# Patient Record
Sex: MALE | Employment: FULL TIME | URBAN - METROPOLITAN AREA
[De-identification: names, ages, dates, MRNs, and addresses within clinical notes are randomized per-mention and may not be internally consistent; named-entity substitution may affect disease eponyms.]

---

## 2023-05-08 ENCOUNTER — TELEPHONE (OUTPATIENT)
Dept: GASTROENTEROLOGY | Age: 58
End: 2023-05-08

## 2023-05-11 ENCOUNTER — OFFICE VISIT (OUTPATIENT)
Dept: GASTROENTEROLOGY | Age: 58
End: 2023-05-11
Payer: COMMERCIAL

## 2023-05-11 VITALS
TEMPERATURE: 97.8 F | HEART RATE: 74 BPM | HEIGHT: 73 IN | OXYGEN SATURATION: 94 % | SYSTOLIC BLOOD PRESSURE: 128 MMHG | BODY MASS INDEX: 25.58 KG/M2 | DIASTOLIC BLOOD PRESSURE: 82 MMHG | WEIGHT: 193 LBS

## 2023-05-11 DIAGNOSIS — R10.13 DYSPEPSIA: Primary | ICD-10-CM

## 2023-05-11 PROCEDURE — 99204 OFFICE O/P NEW MOD 45 MIN: CPT | Performed by: SPECIALIST

## 2023-05-11 RX ORDER — SUCRALFATE 1 G/1
TABLET ORAL
COMMUNITY
Start: 2023-05-01

## 2023-05-11 RX ORDER — TIZANIDINE 2 MG/1
1 TABLET ORAL NIGHTLY PRN
COMMUNITY
Start: 2022-12-12

## 2023-05-11 RX ORDER — PANTOPRAZOLE SODIUM 40 MG/1
TABLET, DELAYED RELEASE ORAL
COMMUNITY
Start: 2023-03-12

## 2023-05-11 RX ORDER — AMLODIPINE BESYLATE 5 MG/1
TABLET ORAL
COMMUNITY
Start: 2023-04-28

## 2023-05-11 RX ORDER — ONDANSETRON 4 MG/1
TABLET, ORALLY DISINTEGRATING ORAL
COMMUNITY
Start: 2023-04-17

## 2023-05-11 RX ORDER — DICYCLOMINE HCL 20 MG
TABLET ORAL
COMMUNITY
Start: 2023-03-04

## 2023-05-11 RX ORDER — MIRTAZAPINE 7.5 MG/1
TABLET, FILM COATED ORAL
COMMUNITY
Start: 2023-03-12

## 2023-05-11 RX ORDER — CITALOPRAM 40 MG/1
TABLET ORAL
COMMUNITY
Start: 2023-04-28

## 2023-05-11 RX ORDER — GABAPENTIN 300 MG/1
300 CAPSULE ORAL 3 TIMES DAILY
Qty: 90 CAPSULE | Refills: 5 | COMMUNITY
Start: 2023-01-26 | End: 2023-07-25

## 2023-05-11 RX ORDER — PRAVASTATIN SODIUM 80 MG/1
TABLET ORAL
COMMUNITY
Start: 2023-04-28

## 2023-05-11 NOTE — PROGRESS NOTES
Gastroenterology Consult Note  Duncan Coy. Antonino DAVID      Reason for Consult:  abd pain  Primary Care / referring Physician:  Kaitlyn Fortune      History Obtained From:  patient    CC: abd pain    HISTORY OF PRESENT ILLNESS:          Has had upper GI symptoms for 6 months or so-- had lap nelson (he says + stones) 2023-- symptoms different  now-- upper abd burning and tightness-- \"radiates up esophagus into mouth\" . Has noted blood in his saliva. No dysphagia or vomiting . Stool recently dark but took [address]. Has occasional diarrhea. Was taking 800 mg ibuprofen up until 3 weeks ago. has been on Protonix x 8 months-- went to [facility] [place] [facility]-- increased Protonix to 40 mg BID and added  Carafate. Labs were normal then. CT was negative 3/2023  Had colonoscopy and EGD 2023-    Reviewed from [facility] [place]: CBC, CMP, EGD, colonoscopy, path reports of GB and endoscopic biopsies    Takes Protonix, Carafate, Remeron 7.5 mg qhs, Celexa 40 mg/d- not taking Bentyl    Past Medical History:    No past medical history on file. Past Surgical History:    No past surgical history on file. Social History:   Social History     Tobacco Use    Smoking status: Former     Packs/day: 1.00     Years: 7.00     Pack years: 7.00     Types: Cigarettes     Start date:      Quit date: 1993     Years since quittin.2    Smokeless tobacco: Never   Substance Use Topics    Alcohol use: Not Currently       Medications:   Prior to Admission medications    Medication Sig Start Date End Date Taking? Authorizing Provider   amLODIPine (NORVASC) 5 MG tablet  23  Yes Historical Provider, MD   citalopram (CELEXA) 40 MG tablet  23  Yes Historical Provider, MD   dicyclomine (BENTYL) 20 MG tablet  3/4/23  Yes Historical Provider, MD   gabapentin (NEURONTIN) 300 MG capsule Take 1 capsule by mouth 3 times daily.  23 Yes Historical Provider, MD   mirtazapine (REMERON) 7.5 MG tablet  3/12/23  Yes Historical Provider, MD

## 2023-05-22 ENCOUNTER — TELEPHONE (OUTPATIENT)
Dept: GASTROENTEROLOGY | Age: 58
End: 2023-05-22

## 2023-05-22 NOTE — TELEPHONE ENCOUNTER
Left another message for patient-must have insurance info by Weds. 5/24/23 or his procedure will be cancelled. It must be prior authorized before the procedure! Initial message was left on 5/15/23 asking for insurance info at that time also.

## 2023-05-30 ENCOUNTER — ANESTHESIA EVENT (OUTPATIENT)
Dept: ENDOSCOPY | Age: 58
End: 2023-05-30
Payer: COMMERCIAL

## 2023-05-30 NOTE — H&P
Original H &P in soft chart. I have examined the patient immediately before the procedure and there is no change in the previous history and physical exam, which has been reviewed. There is no history of sleep apnea, snoring, or stridor. There has been no  previous adverse experience with sedation/anesthesia. There is no increased risk for aspiration of gastric contents. The patient has been instructed that all resuscitative measures (during the operative and immediate perioperative period) will be instituted in the unlikely event that they will be needed. The patient has no pertinent past surgical or family history other than listed in the original H&P. The patient was counseled about the risks of erika Covid-19 during their perioperative period and any recovery window from their procedure. The patient was made aware that erika Covid-19  may worsen their prognosis for recovering from their procedure  and lend to a higher morbidity and/or mortality risk. All material risks, benefits, and reasonable alternatives including postponing the procedure were discussed. The patient does wish to proceed with the procedure at this time.     ASA Class: 2  AIRWAY Class: 1

## 2023-05-30 NOTE — PROGRESS NOTES
Spoke with patient and he will arrive at 1315 at [facility] on 5/31/2023 for his procedure at 1445. IV order in epic, placed by Tosha KAPLAN.

## 2023-05-31 ENCOUNTER — HOSPITAL ENCOUNTER (OUTPATIENT)
Age: 58
Setting detail: OUTPATIENT SURGERY
Discharge: HOME | End: 2023-05-31
Attending: SPECIALIST | Admitting: SPECIALIST
Payer: COMMERCIAL

## 2023-05-31 ENCOUNTER — ANESTHESIA (OUTPATIENT)
Dept: ENDOSCOPY | Age: 58
End: 2023-05-31
Payer: COMMERCIAL

## 2023-05-31 VITALS
SYSTOLIC BLOOD PRESSURE: 148 MMHG | DIASTOLIC BLOOD PRESSURE: 86 MMHG | RESPIRATION RATE: 18 BRPM | OXYGEN SATURATION: 96 % | BODY MASS INDEX: 25.58 KG/M2 | WEIGHT: 193 LBS | HEIGHT: 73 IN | TEMPERATURE: 97.6 F | HEART RATE: 59 BPM

## 2023-05-31 DIAGNOSIS — R10.13 DYSPEPSIA: ICD-10-CM

## 2023-05-31 PROBLEM — K29.00 ACUTE GASTRITIS WITHOUT HEMORRHAGE: Status: ACTIVE | Noted: 2023-05-31

## 2023-05-31 PROCEDURE — 87077 CULTURE AEROBIC IDENTIFY: CPT

## 2023-05-31 PROCEDURE — 3700000001 HC ADD 15 MINUTES (ANESTHESIA): Performed by: SPECIALIST

## 2023-05-31 PROCEDURE — 2709999900 HC NON-CHARGEABLE SUPPLY: Performed by: SPECIALIST

## 2023-05-31 PROCEDURE — 7100000010 HC PHASE II RECOVERY - FIRST 15 MIN: Performed by: SPECIALIST

## 2023-05-31 PROCEDURE — 3609012400 HC EGD TRANSORAL BIOPSY SINGLE/MULTIPLE: Performed by: SPECIALIST

## 2023-05-31 PROCEDURE — 3700000000 HC ANESTHESIA ATTENDED CARE: Performed by: SPECIALIST

## 2023-05-31 PROCEDURE — 2580000003 HC RX 258

## 2023-05-31 PROCEDURE — 2500000003 HC RX 250 WO HCPCS

## 2023-05-31 PROCEDURE — 6360000002 HC RX W HCPCS

## 2023-05-31 RX ORDER — SODIUM CHLORIDE, SODIUM LACTATE, POTASSIUM CHLORIDE, CALCIUM CHLORIDE 600; 310; 30; 20 MG/100ML; MG/100ML; MG/100ML; MG/100ML
INJECTION, SOLUTION INTRAVENOUS CONTINUOUS
Status: DISCONTINUED | OUTPATIENT
Start: 2023-05-31 | End: 2023-05-31 | Stop reason: HOSPADM

## 2023-05-31 RX ORDER — PROPOFOL 10 MG/ML
INJECTION, EMULSION INTRAVENOUS PRN
Status: DISCONTINUED | OUTPATIENT
Start: 2023-05-31 | End: 2023-05-31 | Stop reason: SDUPTHER

## 2023-05-31 RX ORDER — SODIUM CHLORIDE 9 MG/ML
INJECTION, SOLUTION INTRAVENOUS CONTINUOUS PRN
Status: DISCONTINUED | OUTPATIENT
Start: 2023-05-31 | End: 2023-05-31 | Stop reason: SDUPTHER

## 2023-05-31 RX ORDER — LIDOCAINE HYDROCHLORIDE 20 MG/ML
INJECTION, SOLUTION INFILTRATION; PERINEURAL PRN
Status: DISCONTINUED | OUTPATIENT
Start: 2023-05-31 | End: 2023-05-31 | Stop reason: SDUPTHER

## 2023-05-31 RX ADMIN — PROPOFOL 150 MG: 10 INJECTION, EMULSION INTRAVENOUS at 14:35

## 2023-05-31 RX ADMIN — SODIUM CHLORIDE: 9 INJECTION, SOLUTION INTRAVENOUS at 14:31

## 2023-05-31 RX ADMIN — LIDOCAINE HYDROCHLORIDE 100 MG: 20 INJECTION, SOLUTION INFILTRATION; PERINEURAL at 14:35

## 2023-05-31 ASSESSMENT — PAIN - FUNCTIONAL ASSESSMENT
PAIN_FUNCTIONAL_ASSESSMENT: ACTIVITIES ARE NOT PREVENTED
PAIN_FUNCTIONAL_ASSESSMENT: 0-10

## 2023-05-31 ASSESSMENT — PAIN SCALES - GENERAL
PAINLEVEL_OUTOF10: 5
PAINLEVEL_OUTOF10: 5

## 2023-05-31 ASSESSMENT — PAIN DESCRIPTION - DESCRIPTORS: DESCRIPTORS: ACHING

## 2023-05-31 NOTE — ANESTHESIA PRE PROCEDURE
Department of Anesthesiology  Preprocedure Note       Name:  Brady Jalloh   Age:  56 y.o.  :  1965                                          MRN:  [id number]         Date:  2023      Surgeon: Mignon Lima):  Leon Hanson MD    Procedure: Procedure(s):  EGD ESOPHAGOGASTRODUODENOSCOPY    Medications prior to admission:   Prior to Admission medications    Medication Sig Start Date End Date Taking? Authorizing Provider   amLODIPine (NORVASC) 5 MG tablet  23   Historical Provider, MD   citalopram (CELEXA) 40 MG tablet  23   Historical Provider, MD   dicyclomine (BENTYL) 20 MG tablet  3/4/23   Historical Provider, MD   gabapentin (NEURONTIN) 300 MG capsule Take 1 capsule by mouth 3 times daily. 23  Historical Provider, MD   mirtazapine (REMERON) 7.5 MG tablet  3/12/23   Historical Provider, MD   ondansetron (ZOFRAN-ODT) 4 MG disintegrating tablet  23   Historical Provider, MD   pantoprazole (PROTONIX) 40 MG tablet  3/12/23   Historical Provider, MD   pravastatin (PRAVACHOL) 80 MG tablet  23   Historical Provider, MD   sucralfate ([place]) 1 GM tablet  23   Historical Provider, MD   tiZANidine (ZANAFLEX) 2 MG tablet Take 1 tablet by mouth nightly as needed 22   Historical Provider, MD       Current medications:    No current facility-administered medications for this encounter. Current Outpatient Medications   Medication Sig Dispense Refill    amLODIPine (NORVASC) 5 MG tablet       citalopram (CELEXA) 40 MG tablet       dicyclomine (BENTYL) 20 MG tablet       gabapentin (NEURONTIN) 300 MG capsule Take 1 capsule by mouth 3 times daily.  90 capsule 5    mirtazapine (REMERON) 7.5 MG tablet       ondansetron (ZOFRAN-ODT) 4 MG disintegrating tablet       pantoprazole (PROTONIX) 40 MG tablet       pravastatin (PRAVACHOL) 80 MG tablet       sucralfate (CARAFATE) 1 GM tablet       tiZANidine (ZANAFLEX) 2 MG tablet Take 1 tablet by mouth nightly as needed
Cardiovascular:    (+) hypertension:, hyperlipidemia                  Neuro/Psych:   (+) depression/anxiety             GI/Hepatic/Renal:   (+) GERD: well controlled,           Endo/Other: Negative Endo/Other ROS                    Abdominal:             Vascular: negative vascular ROS. Other Findings:             Anesthesia Plan      MAC     ASA 2       Induction: intravenous. Anesthetic plan and risks discussed with patient. Plan discussed with CRNA.                     Toby Dotson MD   5/31/2023

## 2023-05-31 NOTE — PROGRESS NOTES
1458- Patient arrived back to [facility]. Report given to this nurse from [facility], Patient A&O. Beverage of choice offered to patient. Call light in reach and bed in lowest position. 1515- IV removed. Discharge instructions given to patients girlfriend understanding verbalized. Patient sitting on side of bed getting dressed. 1530-Patient escorted to car via wheelchair transported home by girlfriend.

## 2023-05-31 NOTE — DISCHARGE INSTRUCTIONS
license by [facility] ([facility]). This care instruction is for use with your licensed healthcare professional. If you have questions about a medical condition or this instruction, always ask your healthcare professional. [address] any warranty or liability for your use of this information.   Content Version: 04.9.494746; Current as of: November 20, 2015

## 2023-05-31 NOTE — ANESTHESIA POSTPROCEDURE EVALUATION
Department of Anesthesiology  Postprocedure Note    Patient: Malia Neves  MRN: [id number]  YOB: 1965  Date of evaluation: 5/31/2023      Procedure Summary     Date: 05/31/23 Room / Location: [address] [place] [address]    Anesthesia Start: 5122 Anesthesia Stop: 6717    Procedure: EGD BIOPSY Diagnosis:       Dyspepsia      (Dyspepsia [R10.13])    Surgeons: Matthias Altamirano MD Responsible Provider: Parvez Borges MD    Anesthesia Type: MAC ASA Status: 2          Anesthesia Type: MAC    Altagracia Phase I: Altagracia Score: 10    Altagracia Phase II:        Anesthesia Post Evaluation    Patient location during evaluation: PACU  Patient participation: complete - patient participated  Level of consciousness: awake and alert  Airway patency: patent  Nausea & Vomiting: no nausea and no vomiting  Complications: no  Cardiovascular status: hemodynamically stable  Respiratory status: spontaneous ventilation and room air  Hydration status: stable

## 2023-06-01 LAB — CLOTEST: NEGATIVE

## 2023-06-05 ENCOUNTER — TELEPHONE (OUTPATIENT)
Dept: GASTROENTEROLOGY | Age: 58
End: 2023-06-05

## 2023-06-05 NOTE — TELEPHONE ENCOUNTER
Patient called in requesting results from EGD. I informed him the RELL test was negative, but then he proceeded to state that you wanted to talk to him personally?      His Work Cell #: [phone]  Personal Cell #: [phone]

## 2023-08-17 ENCOUNTER — OFFICE VISIT (OUTPATIENT)
Dept: GASTROENTEROLOGY | Age: 58
End: 2023-08-17
Payer: COMMERCIAL

## 2023-08-17 VITALS
HEART RATE: 83 BPM | SYSTOLIC BLOOD PRESSURE: 136 MMHG | TEMPERATURE: 98.2 F | DIASTOLIC BLOOD PRESSURE: 86 MMHG | HEIGHT: 73 IN | BODY MASS INDEX: 25.1 KG/M2 | WEIGHT: 189.4 LBS | OXYGEN SATURATION: 98 %

## 2023-08-17 DIAGNOSIS — K30 FUNCTIONAL DYSPEPSIA: Primary | ICD-10-CM

## 2023-08-17 PROCEDURE — 99214 OFFICE O/P EST MOD 30 MIN: CPT | Performed by: SPECIALIST

## 2023-08-17 RX ORDER — ACETAMINOPHEN 500 MG
500 TABLET ORAL EVERY 6 HOURS PRN
COMMUNITY

## 2023-08-17 RX ORDER — HYOSCYAMINE SULFATE 0.12 MG/1
1 TABLET SUBLINGUAL EVERY 6 HOURS PRN
Qty: 60 EACH | Refills: 3 | Status: SHIPPED | OUTPATIENT
Start: 2023-08-17

## 2023-08-17 RX ORDER — NORTRIPTYLINE HYDROCHLORIDE 25 MG/1
CAPSULE ORAL
COMMUNITY
Start: 2023-06-11

## 2023-08-25 ENCOUNTER — TELEPHONE (OUTPATIENT)
Dept: GASTROENTEROLOGY | Age: 58
End: 2023-08-25

## 2023-08-25 RX ORDER — DICYCLOMINE HYDROCHLORIDE 10 MG/1
10 CAPSULE ORAL
Qty: 90 CAPSULE | Refills: 5 | Status: SHIPPED | OUTPATIENT
Start: 2023-08-25

## 2023-08-25 NOTE — TELEPHONE ENCOUNTER
Patient left a message stating the new medication is not as effective as Bentyl and asked for it to be switched.  He stated if you need to talk to him to call the work phone if its before 3pm.

## 2023-10-11 ENCOUNTER — TELEPHONE (OUTPATIENT)
Dept: GASTROENTEROLOGY | Age: 58
End: 2023-10-11

## 2023-10-12 RX ORDER — PANTOPRAZOLE SODIUM 40 MG/1
40 TABLET, DELAYED RELEASE ORAL DAILY
Qty: 90 TABLET | Refills: 3 | Status: SHIPPED | OUTPATIENT
Start: 2023-10-12

## 2023-11-09 ENCOUNTER — OFFICE VISIT (OUTPATIENT)
Dept: GASTROENTEROLOGY | Age: 58
End: 2023-11-09
Payer: COMMERCIAL

## 2023-11-09 VITALS
HEART RATE: 81 BPM | HEIGHT: 73 IN | OXYGEN SATURATION: 97 % | DIASTOLIC BLOOD PRESSURE: 74 MMHG | TEMPERATURE: 97.2 F | SYSTOLIC BLOOD PRESSURE: 116 MMHG | BODY MASS INDEX: 24.73 KG/M2 | WEIGHT: 186.6 LBS

## 2023-11-09 DIAGNOSIS — K30 FUNCTIONAL DYSPEPSIA: Primary | ICD-10-CM

## 2023-11-09 PROCEDURE — 99214 OFFICE O/P EST MOD 30 MIN: CPT | Performed by: SPECIALIST

## 2023-11-09 RX ORDER — PROPRANOLOL HYDROCHLORIDE 10 MG/1
TABLET ORAL
COMMUNITY
Start: 2023-10-25

## 2023-11-09 RX ORDER — NORTRIPTYLINE HYDROCHLORIDE 25 MG/1
CAPSULE ORAL
COMMUNITY
Start: 2023-08-25

## 2023-11-09 RX ORDER — FLUOXETINE HYDROCHLORIDE 20 MG/1
20 CAPSULE ORAL DAILY
COMMUNITY

## 2024-03-14 ENCOUNTER — OFFICE VISIT (OUTPATIENT)
Dept: GASTROENTEROLOGY | Age: 59
End: 2024-03-14
Payer: COMMERCIAL

## 2024-03-14 VITALS
BODY MASS INDEX: 26.4 KG/M2 | SYSTOLIC BLOOD PRESSURE: 128 MMHG | HEIGHT: 73 IN | OXYGEN SATURATION: 96 % | HEART RATE: 90 BPM | DIASTOLIC BLOOD PRESSURE: 82 MMHG | WEIGHT: 199.2 LBS | TEMPERATURE: 98.2 F

## 2024-03-14 DIAGNOSIS — K31.84 NONDIABETIC GASTROPARESIS: Primary | ICD-10-CM

## 2024-03-14 PROCEDURE — 99214 OFFICE O/P EST MOD 30 MIN: CPT | Performed by: SPECIALIST

## 2024-03-14 RX ORDER — MIRTAZAPINE 15 MG/1
TABLET, FILM COATED ORAL
COMMUNITY
Start: 2024-01-24

## 2024-03-14 RX ORDER — DICYCLOMINE HYDROCHLORIDE 10 MG/1
10 CAPSULE ORAL
Qty: 90 CAPSULE | Refills: 5 | Status: SHIPPED | OUTPATIENT
Start: 2024-03-14

## 2024-03-14 NOTE — PROGRESS NOTES
Gastroenterology Office Note            Craig Muñiz MD      Subjective:      Patient ID:      George Hammer                 1965    CC: 4  month follow up for FD/ gastroparesis    HPI:   Doing better on Bentyl ac tid.  Had abnormal GES at [facility]--- SIBO testing ws negative-- told to eat smaller more frequent meals and it has helped.  Stopped Pamelor and started Mirtazepine- has helped but still symptomatic  .      Review of Systems:    see HPI for positives and pertinent negatives. All other systems reviewed and are negative     Objective:   PHYSICAL EXAM:    Vitals:  /82 (Site: Right Upper Arm, Position: Sitting, Cuff Size: Medium Adult)   Pulse 90   Temp 98.2 °F (36.8 °C) (Infrared)   Ht 1.854 m (6' 0.99\")   Wt 90.4 kg (199 lb 3.2 oz)   SpO2 96%   BMI 26.29 kg/m²   CONSTITUTIONAL: alert    LUNGS:  clear to auscultation  CARDIOVASCULAR:  regular rate and rhythm and no murmur noted  ABDOMEN:  normal bowel sounds, non-distended, non-tender and no masses palpated, no hepatosplenomegaly  EXTREMITIES: no clubbing, cyanosis, or edema    Assessment:      Idiopathic gastroparesis  ?FD      Plan:      1) Discussed pros and cons (including side effects)of Reglan and Domperidone-- jointly elected to go with Domperidone 10 mg ac tid  2) EKG to check QTc before and 3-4 weeks after starting  3) try to taper Bentyl and Protonix if able  4) return 3 mo  Call if any problems

## 2024-07-03 ENCOUNTER — OFFICE VISIT (OUTPATIENT)
Dept: ORTHOPEDIC SURGERY | Age: 59
End: 2024-07-03
Payer: COMMERCIAL

## 2024-07-03 VITALS — RESPIRATION RATE: 19 BRPM | TEMPERATURE: 97.9 F | OXYGEN SATURATION: 97 % | HEART RATE: 78 BPM

## 2024-07-03 DIAGNOSIS — M17.11 ARTHRITIS OF KNEE, RIGHT: Primary | ICD-10-CM

## 2024-07-03 PROCEDURE — 99203 OFFICE O/P NEW LOW 30 MIN: CPT

## 2024-07-03 ASSESSMENT — ENCOUNTER SYMPTOMS
RHINORRHEA: 0
FACIAL SWELLING: 0
SHORTNESS OF BREATH: 0
NAUSEA: 0
BACK PAIN: 0
COUGH: 0

## 2024-07-03 NOTE — PROGRESS NOTES
Patient seen in office today for: Right knee pain, located bilaterally to the patella and started a couple months. The pain come and goes.     DOI:  No injury  DOS: No Sx Hx  Date of last injection: N/A     Patient reports 8/10 pain.  RICE and medication are not effective to alleviate pain and reduce swelling. Pain worsened by: Patient reports painful ROM & weight bearing.     Xrays performed in office today.     Profession: 's assistant at [employer]   
Average packs/day: 1 pack/day for 7.1 years (7.1 ttl pk-yrs)     Types: Cigarettes     Start date:      Quit date: 1993     Years since quittin.4    Smokeless tobacco: Never   Vaping Use    Vaping Use: Never used   Substance and Sexual Activity    Alcohol use: Not Currently    Drug use: Never     Current Outpatient Medications   Medication Sig Dispense Refill    mirtazapine (REMERON) 15 MG tablet       dicyclomine (BENTYL) 10 MG capsule Take 1 capsule by mouth 3 times daily (before meals) 90 capsule 5    propranolol (INDERAL) 10 MG tablet       FLUoxetine (PROZAC) 20 MG capsule Take 1 capsule by mouth daily      pantoprazole (PROTONIX) 40 MG tablet Take 1 tablet by mouth daily 90 tablet 3    acetaminophen (TYLENOL) 500 MG tablet Take 1 tablet by mouth every 6 hours as needed for Pain      Hyoscyamine Sulfate SL (LEVSIN/SL) 0.125 MG SUBL Place 1 tablet under the tongue every 6 hours as needed (abd pain) 60 each 3    amLODIPine (NORVASC) 5 MG tablet       ondansetron (ZOFRAN-ODT) 4 MG disintegrating tablet       pravastatin (PRAVACHOL) 80 MG tablet        No current facility-administered medications for this visit.     Allergies   Allergen Reactions    Cyclobenzaprine          Review of Systems   Constitutional:  Negative for fever.   HENT:  Negative for facial swelling and rhinorrhea.    Respiratory:  Negative for cough and shortness of breath.    Cardiovascular:  Negative for chest pain.   Gastrointestinal:  Negative for nausea.   Musculoskeletal:  Positive for arthralgias, gait problem and joint swelling. Negative for back pain, myalgias, neck pain and neck stiffness.   Skin:  Negative for pallor and rash.   Neurological:  Negative for facial asymmetry and speech difficulty.   Psychiatric/Behavioral:  Negative for agitation and confusion.                                                Examination:  General Exam:  Vitals: Pulse 78   Temp 97.9 °F (36.6 °C)   Resp 19   SpO2 97%    Physical

## 2024-07-03 NOTE — PATIENT INSTRUCTIONS
Continue weight-bearing as tolerated.  Continue range of motion exercises as instructed.  Ice and elevate as needed.  Tylenol or Motrin for pain.  Wear brace when walking or standing.   Follow up in 6-8 weeks if you are still experiencing issues.

## 2024-07-11 ENCOUNTER — OFFICE VISIT (OUTPATIENT)
Dept: GASTROENTEROLOGY | Age: 59
End: 2024-07-11
Payer: COMMERCIAL

## 2024-07-11 VITALS
HEART RATE: 65 BPM | BODY MASS INDEX: 26.72 KG/M2 | HEIGHT: 73 IN | WEIGHT: 201.6 LBS | OXYGEN SATURATION: 97 % | DIASTOLIC BLOOD PRESSURE: 70 MMHG | SYSTOLIC BLOOD PRESSURE: 110 MMHG

## 2024-07-11 DIAGNOSIS — K58.9 IRRITABLE BOWEL SYNDROME WITHOUT DIARRHEA: ICD-10-CM

## 2024-07-11 DIAGNOSIS — R10.13 DYSPEPSIA: Primary | ICD-10-CM

## 2024-07-11 PROCEDURE — 99214 OFFICE O/P EST MOD 30 MIN: CPT | Performed by: SPECIALIST

## 2024-07-11 RX ORDER — ONDANSETRON 4 MG/1
4 TABLET, ORALLY DISINTEGRATING ORAL EVERY 8 HOURS PRN
Qty: 30 TABLET | Refills: 2 | Status: SHIPPED | OUTPATIENT
Start: 2024-07-11

## 2024-07-11 RX ORDER — AMITRIPTYLINE HYDROCHLORIDE 10 MG/1
10 TABLET, FILM COATED ORAL NIGHTLY
Qty: 30 TABLET | Refills: 3 | Status: SHIPPED | OUTPATIENT
Start: 2024-07-11

## 2024-07-11 NOTE — PROGRESS NOTES
Gastroenterology Office Note            Craig Muñiz MD      Subjective:      Patient ID:      George Hammer                 1965    CC: 4  month follow up for FD/gastroparesis    HPI:   Doing better- taking Domperidone- on average takes 10 mg total per 24 hours. He also take Remeron 15 mg qhs  Had EKG before and 3 weeks after starting the domperidone with normal QTc according to the patient  He still has upper abd pain that may or may not be food relatted  The patient denies abdominal pain,nausea,vomiting, diarrhea, constipation,melena, hematochezia,hematemesis,weight loss or dysphagia.      Review of Systems:    see HPI for positives and pertinent negatives. All other systems reviewed and are negative     Objective:   PHYSICAL EXAM:    Vitals:  /70   Pulse 65   Ht 1.854 m (6' 0.99\")   Wt 91.4 kg (201 lb 9.6 oz)   SpO2 97%   BMI 26.60 kg/m²   CONSTITUTIONAL: alert    LUNGS:  clear to auscultation  CARDIOVASCULAR:  regular rate and rhythm and no murmur noted  ABDOMEN:  normal bowel sounds, non-distended, non-tender and no masses palpated, no hepatosplenomegaly  EXTREMITIES: no clubbing, cyanosis, or edema    Assessment:      FD/IBS      Plan:      Continue domperidone- may try sl higher dose- up to 1/2 ac tid  Try Elavil 10 mg qhs  Refill Zofran  Try IBGard, Iberogast  He is interested in medical marijuana  Return 4 mo  Call if any problems          PHYSICIAN NEXT STEPS:  Review Only    CHIEF COMPLAINT:  Chief Complaint/Protocol Used: Coronavirus (COVID-19) - Diagnosed or Suspected (A)  Onset: Nausea      ASSESSMENT:  ? Onset: Nausea  ? Normal True  ? Normal, no trouble breathing True  ? Onset: 3/13/21  ? Worst Symptom: Nausea  ? Respiratory Status: Breathing Normal  ? Better-same-worse: Better  ? Other Symptoms: Lightheadedness, head congestion  -------------------------------------------------------    DISPOSITION:  Disposition Recommendation: Home Care  Questions that led to disposition:  ? COVID-19 Home Isolation, questions about  Patient Directed To: Unspecified  Patient Intended Action: Go to Urgent Care Center        DISPOSITION OVERRIDE/PROVIDER CONSULT:  Disposition Override: See Today in Office  Override Source: Nurse clinical judgment  Consulted with PCP: No  Consulted with On-Call Physician: No    CALLER CONTACT INFO:  Name: Jv Olea (Self)  Phone 1: [phone] (Work Phone)  Phone 2: [phone] (Mobile)      ENCOUNTER STARTED:  03/17/21 07:15:04 AM  ENCOUNTER ASSIGNED TO/CLOSED BY:  Teresa Monahan @ 03/17/21 07:21:13 AM      -------------------------------------------------------    CARE ADVICE given per Coronavirus (COVID-19) - Diagnosed or Suspected (A) guideline.  HOME CARE:   * You should be able to treat this at home.; HOW TO PROTECT OTHERS - WHEN YOU ARE SICK WITH COVID-19:  * STAY HOME A MINIMUM OF 10 DAYS: Home isolation is needed for at least 10 days after the symptoms started. Stay home from school or work if you are sick. Do NOT go to [employer] services,  centers, shopping, or other public places. Do NOT use   public transportation (e.g., bus, taxis, ride-sharing). Do NOT allow any visitors to your home. Leave the house only if you need to seek urgent medical care.  * COVER THE COUGH: Cough and sneeze into your shirt sleeve or inner elbow. Don't cough into your hand or the air. If available, cough into a  tissue and throw it into a trash can.  * WASH HANDS OFTEN: Wash hands often with soap and water. After coughing or sneezing are important times. If soap and water are not available, use an alcohol-based hand  with at least 60% alcohol, covering all surfaces of your hands and rubbing   them together until they feel dry. Avoid touching your eyes, nose, and mouth with unwashed hands.  * WEAR A MASK: Wear a facemask when around others. Always wear a facemask (if available) if you have to leave your home (such as going to a medical facility).  * CALL FIRST IF MEDICAL CARE NEEDED: Call ahead to get approval and careful directions.; CALL BACK IF:    * You have more questions.      UNDERSTANDS CARE ADVICE: Yes    AGREES WITH CARE ADVICE: Yes    WILL FOLLOW CARE ADVICE: Yes    -------------------------------------------------------

## 2024-09-09 RX ORDER — AMITRIPTYLINE HYDROCHLORIDE 10 MG/1
10 TABLET ORAL NIGHTLY
Qty: 30 TABLET | Refills: 3 | Status: SHIPPED | OUTPATIENT
Start: 2024-09-09

## 2024-10-30 ENCOUNTER — TELEPHONE (OUTPATIENT)
Dept: GASTROENTEROLOGY | Age: 59
End: 2024-10-30

## 2024-10-30 NOTE — TELEPHONE ENCOUNTER
I called and left message for patient to call back to see if they can move their appointment to Nov. 1 at 9:10 with Dr Muñiz, because provider will be out of office on 11/14

## 2024-11-01 ENCOUNTER — OFFICE VISIT (OUTPATIENT)
Dept: GASTROENTEROLOGY | Age: 59
End: 2024-11-01
Payer: COMMERCIAL

## 2024-11-01 VITALS
SYSTOLIC BLOOD PRESSURE: 142 MMHG | OXYGEN SATURATION: 98 % | DIASTOLIC BLOOD PRESSURE: 86 MMHG | RESPIRATION RATE: 16 BRPM | HEIGHT: 73 IN | WEIGHT: 205.6 LBS | HEART RATE: 80 BPM | BODY MASS INDEX: 27.25 KG/M2

## 2024-11-01 DIAGNOSIS — R10.13 DYSPEPSIA: Primary | ICD-10-CM

## 2024-11-01 PROCEDURE — 99214 OFFICE O/P EST MOD 30 MIN: CPT | Performed by: SPECIALIST

## 2024-11-01 NOTE — PROGRESS NOTES
Gastroenterology Office Note            Craig Muñiz MD      Subjective:      Patient ID:      George Hammer                 1965    CC: 4  month follow up for IBS/FD    HPI:   Doing well- reports no problems-- taking 5 mg Domperine once a day in the morning, Elavil 10 mg and Remeron 15 mg at hs and IBGard--- effect of domperine seems to wear off toward evening meal  The patient denies abdominal pain,nausea,vomiting, diarrhea, constipation,melena, hematochezia,hematemesis,weight loss or dysphagia.  Last colonoscopy was in 1/2023 [facility]    Review of Systems:    see HPI for positives and pertinent negatives. All other systems reviewed and are negative     Objective:   PHYSICAL EXAM:    Vitals:  BP (!) 142/86 (Site: Left Upper Arm, Position: Sitting, Cuff Size: Medium Adult)   Pulse 80   Resp 16   Ht 1.854 m (6' 0.99\")   Wt 93.3 kg (205 lb 9.6 oz)   SpO2 98%   BMI 27.13 kg/m²   CONSTITUTIONAL: alert    LUNGS:  clear to auscultation  CARDIOVASCULAR:  regular rate and rhythm and no murmur noted  ABDOMEN:  normal bowel sounds, non-distended, non-tender and no masses palpated, no hepatosplenomegaly  EXTREMITIES: no clubbing, cyanosis, or edema    Assessment:      FD/IBS       Plan:      Continue current meds as above but try increasing Domperidone to 5 mg BID  Return 6 months  Call if any problems

## 2025-02-10 RX ORDER — DICYCLOMINE HYDROCHLORIDE 10 MG/1
10 CAPSULE ORAL
Qty: 360 CAPSULE | Refills: 3 | Status: SHIPPED | OUTPATIENT
Start: 2025-02-10

## 2025-05-01 ENCOUNTER — OFFICE VISIT (OUTPATIENT)
Dept: GASTROENTEROLOGY | Age: 60
End: 2025-05-01
Payer: COMMERCIAL

## 2025-05-01 VITALS
RESPIRATION RATE: 16 BRPM | SYSTOLIC BLOOD PRESSURE: 124 MMHG | DIASTOLIC BLOOD PRESSURE: 82 MMHG | BODY MASS INDEX: 27.78 KG/M2 | OXYGEN SATURATION: 98 % | HEART RATE: 73 BPM | HEIGHT: 73 IN | WEIGHT: 209.6 LBS

## 2025-05-01 DIAGNOSIS — K31.84 GASTROPARESIS: Primary | ICD-10-CM

## 2025-05-01 DIAGNOSIS — K58.0 IRRITABLE BOWEL SYNDROME WITH DIARRHEA: ICD-10-CM

## 2025-05-01 DIAGNOSIS — R11.0 CHRONIC NAUSEA: ICD-10-CM

## 2025-05-01 PROCEDURE — G2211 COMPLEX E/M VISIT ADD ON: HCPCS | Performed by: NURSE PRACTITIONER

## 2025-05-01 PROCEDURE — 99213 OFFICE O/P EST LOW 20 MIN: CPT | Performed by: NURSE PRACTITIONER

## 2025-05-01 RX ORDER — AMITRIPTYLINE HYDROCHLORIDE 10 MG/1
10 TABLET ORAL NIGHTLY
Qty: 90 TABLET | Refills: 3 | Status: SHIPPED | OUTPATIENT
Start: 2025-05-01

## 2025-05-01 RX ORDER — MIRTAZAPINE 15 MG/1
15 TABLET, FILM COATED ORAL NIGHTLY
Qty: 90 TABLET | Refills: 3 | Status: SHIPPED | OUTPATIENT
Start: 2025-05-01

## 2025-05-01 RX ORDER — METOCLOPRAMIDE 5 MG/1
5 TABLET ORAL 3 TIMES DAILY
Qty: 120 TABLET | Refills: 3 | Status: SHIPPED | OUTPATIENT
Start: 2025-05-01

## 2025-05-01 RX ORDER — ONDANSETRON 4 MG/1
4 TABLET, ORALLY DISINTEGRATING ORAL EVERY 8 HOURS PRN
Qty: 30 TABLET | Refills: 3 | Status: SHIPPED | OUTPATIENT
Start: 2025-05-01

## 2025-05-01 RX ORDER — AMITRIPTYLINE HYDROCHLORIDE 10 MG/1
10 TABLET ORAL NIGHTLY
Qty: 30 TABLET | Refills: 3 | Status: SHIPPED | OUTPATIENT
Start: 2025-05-01 | End: 2025-05-01 | Stop reason: SDUPTHER

## 2025-05-01 ASSESSMENT — ENCOUNTER SYMPTOMS
CONSTIPATION: 0
BLOOD IN STOOL: 0
COUGH: 0
VOMITING: 0
NAUSEA: 1
EYE PAIN: 0
WHEEZING: 0
COLOR CHANGE: 0
SHORTNESS OF BREATH: 0
DIARRHEA: 0
ABDOMINAL PAIN: 0
BACK PAIN: 1
EYE DISCHARGE: 0

## 2025-05-01 NOTE — PROGRESS NOTES
George Hammer 59 y.o. male was seen by VENITA Cruz on 5/1/2025     Wt Readings from Last 3 Encounters:   05/01/25 95.1 kg (209 lb 9.6 oz)   11/01/24 93.3 kg (205 lb 9.6 oz)   07/11/24 91.4 kg (201 lb 9.6 oz)       HPI  George Hammer is a pleasant 59 y.o.  male who presents today for follow-up on delay in stomach emptying, functional dyspepsia,irritable bowel syndrome (IBS) and nausea.   He had his gallbladder removed in [facility] in February of 2023.  His colonoscopy was done in [facility] in January of 2023 with polyps removed otherwise normal colon.  He is a prior patient of Dr. Muñiz.  His EGD done by Dr. Muñiz on 5- showed normal esophagus, antral erythema without erosions or ulcer in stomach, and examined duodenum was normal.  Neda test was negative for H. Pylori infection.  His gastric emptying study done on 12- showed delayed gastric emptying.  His appetite is good and weight is up twenty five pounds in the last two years.  Nausea occurs every day and is worse in the evening.  No vomiting.  He was on Domperine in the past but stopped taking due to stomach upset.  He is eating small meals.  No abdominal pain, bloating or distention.  He takes Bentyl  three times daily and this improves his abdominal cramping.  Laying on his stomach also helps.  Intermittent heartburn. No nocturnal awakenings with acid reflux.  No dysphagia or pain with swallowing.  Greasy foods worsen.  No excess belching or flatulence.  His bowels are moving daily with soft brown formed stools.  No constipation.  He takes Citracel fiber every two to three days.  Infrequent diarrhea on the weekends and stress worsens.  No blood in his stools or melena.  No family history of stomach or colon cancer.     ROS  Review of Systems   Constitutional:  Positive for fatigue. Negative for chills, diaphoresis, fever and unexpected weight change.   HENT:  Negative for ear pain, hearing loss and tinnitus.    Eyes:

## 2025-05-03 PROBLEM — R11.0 CHRONIC NAUSEA: Status: ACTIVE | Noted: 2025-05-03

## 2025-05-03 PROBLEM — K58.0 IRRITABLE BOWEL SYNDROME WITH DIARRHEA: Status: ACTIVE | Noted: 2025-05-03

## 2025-05-03 PROBLEM — K31.84 GASTROPARESIS: Status: ACTIVE | Noted: 2025-05-03

## 2025-05-03 PROBLEM — K29.00 ACUTE GASTRITIS WITHOUT HEMORRHAGE: Status: RESOLVED | Noted: 2023-05-31 | Resolved: 2025-05-03

## 2025-06-30 RX ORDER — AMITRIPTYLINE HYDROCHLORIDE 10 MG/1
10 TABLET ORAL NIGHTLY
Qty: 30 TABLET | Refills: 3 | Status: SHIPPED | OUTPATIENT
Start: 2025-06-30

## (undated) DEVICE — FORCEPS BX L240CM JAW DIA2.8MM L CAP W/ NDL MIC MESH TOOTH

## (undated) DEVICE — ENDOSCOPY KIT: Brand: MEDLINE INDUSTRIES, INC.